# Patient Record
(demographics unavailable — no encounter records)

---

## 2024-10-23 NOTE — DISCUSSION/SUMMARY
[FreeTextEntry1] : 7 mo old male here for flu and pcv vaccine.  GIven in R thigh  offered rsv vaccine, parents want to think about it and will return if desired [] : The components of the vaccine(s) to be administered today are listed in the plan of care. The disease(s) for which the vaccine(s) are intended to prevent and the risks have been discussed with the caretaker.  The risks are also included in the appropriate vaccination information statements which have been provided to the patient's caregiver.  The caregiver has given consent to vaccinate.

## 2024-10-23 NOTE — HISTORY OF PRESENT ILLNESS
[PCV 20] : PCV 20 [Influenza] : Influenza [FreeTextEntry1] : 7 mo old no recent fevers.illness here for flu #2 and pcv 20 #3

## 2024-12-12 NOTE — HISTORY OF PRESENT ILLNESS
[TextBox_4] : YAMILET is here for evaluation today. He was born at term after an unassisted conception and was circumcised in the nursery. 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 The penis has not changed in its configuration since the parents first noticed this. No urinary tract or penile redness or infections. He makes ample wet diapers without hematuria. No family history of penile abnormalities.

## 2024-12-12 NOTE — ASSESSMENT
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 I discussed the options including observation and surgery. The principles of the operation and the anticipated postoperative course were discussed.  After discussing the risks and benefits and possible complications (including but not limited to penile injury, bleeding, infection, penile deformity and need for additional surgery), the decision to proceed with revision of a circumcision under general anesthesia was made.  All questions were answered.

## 2024-12-12 NOTE — PHYSICAL EXAM
[TextBox_92] : PENIS: Straight circumcised penis with redundant foreskin.  SCROTUM: Bilaterally symmetric testes in dependent position without palpable mass, hernia or hydrocele.

## 2024-12-12 NOTE — CONSULT LETTER
[FreeTextEntry1] : Dear Dr. MARIPOSA BUCK,      I had the pleasure of consulting on YAMILET BEGUMNT today.  Below is my note regarding the office visit today.      Thank you so very much for allowing me to participate in YAMILET's care.  Please don't hesitate to call me should any questions or issues arise.        Sincerely,     Scotty Loving MD, FACS, FSPU    Chief, Pediatric Urology    Professor of Urology and Pediatrics    [employer]        President, [employer] U[employer] Association - [place] Section    Past-President, Societies for Pediatric U[employer]

## 2024-12-12 NOTE — CONSULT LETTER
[FreeTextEntry1] : Dear Dr. MARIPOSA BUCK,      I had the pleasure of consulting on YAMILET BEGUMNT today.  Below is my note regarding the office visit today.      Thank you so very much for allowing me to participate in YAMILET's care.  Please don't hesitate to call me should any questions or issues arise.        Sincerely,     Scotty Loving MD, FACS, FSPU    Chief, Pediatric Urology    Professor of Urology and Pediatrics    [employer]        President, American U[employer] Association - [place] Section    Past-President, Societies for Pediatric Urology None

## 2024-12-12 NOTE — CONSULT LETTER
[FreeTextEntry1] : Dear Dr. MARIPOSA BUCK,      I had the pleasure of consulting on YAMILET BEGUMNT today.  Below is my note regarding the office visit today.      Thank you so very much for allowing me to participate in YAMILET's care.  Please don't hesitate to call me should any questions or issues arise.        Sincerely,     Scotty Loving MD, FACS, FSPU    Chief, Pediatric Urology    Professor of Urology and Pediatrics    [employer]        President, American Urological Association - [place] Section    Past-President, Societies for Pediatric Urology

## 2024-12-18 NOTE — DISCUSSION/SUMMARY
[Normal Growth] : growth [Normal Development] : development [None] : No known medical problems [No Elimination Concerns] : elimination [No Feeding Concerns] : feeding [No Skin Concerns] : skin [Normal Sleep Pattern] : sleep [Term Infant] : Term infant [Lack Of Adequate Sleep] : lack of adequate sleep [No Medications] : ~He/She~ is not on any medications [Parent/Guardian] : parent/guardian [] : The components of the vaccine(s) to be administered today are listed in the plan of care. The disease(s) for which the vaccine(s) are intended to prevent and the risks have been discussed with the caretaker.  The risks are also included in the appropriate vaccination information statements which have been provided to the patient's caregiver.  The caregiver has given consent to vaccinate. [FreeTextEntry1] : Continue breast-milk or formula as desired. Increase table foods, 3 meals with 2-3 snacks per day. Incorporate up to 6 oz of flourinated water daily in a sippy cup. Discussed weaning of bottle and pacifier. Wipe teeth daily with washcloth. When in car, patient should be in rear-facing car seat in back seat. Put baby to sleep in own crib with no loose or soft bedding. Lower crib matres. Help baby to maintain consistent daily routines and sleep schedule. Recognize stranger anxiety. Ensure home is safe since baby is increasingly mobile. Be within arm's reach of baby at all times. Use consistent, positive discipline. Avoid screen time. Read aloud to baby. Child proof safety the home discussed.

## 2024-12-18 NOTE — PHYSICAL EXAM
[Alert] : alert [Acute Distress] : no acute distress [Normocephalic] : normocephalic [Flat Open Anterior [place]] : flat open anterior fontanelle [Red Reflex] : red reflex bilateral [Excessive Tearing] : no excessive tearing [PERRL] : PERRL [Normally Placed Ears] : normally placed ears [Auricles Well Formed] : auricles well formed [Clear Tympanic membranes] : clear tympanic membranes [Light reflex present] : light reflex present [Bony landmarks visible] : bony landmarks visible [Discharge] : no discharge [Nares Patent] : nares patent [Palate Intact] : palate intact [Uvula Midline] : uvula midline [Tooth Eruption] : tooth eruption [Supple, full passive range of motion] : supple, full passive range of motion [Palpable Masses] : no palpable masses [Symmetric Chest Rise] : symmetric chest rise [Clear to Auscultation Bilaterally] : clear to auscultation bilaterally [Regular Rate and Rhythm] : regular rate and rhythm [S1, S2 present] : S1, S2 present [Murmurs] : no murmurs [+2 Femoral Pulses] : (+) 2 femoral pulses [Soft] : soft [Tender] : nontender [Distended] : nondistended [Bowel Sounds] : bowel sounds present [Hepatomegaly] : no hepatomegaly [Splenomegaly] : no splenomegaly [Central Urethral Opening] : central urethral opening [Testicles Descended] : testicles descended bilaterally [No Abnormal Lymph Nodes Palpated] : no abnormal lymph nodes palpated [Symmetric Abduction and Rotation of hips] : symmetric abduction and rotation of hips [Allis Sign] : negative Allis sign [Straight] : straight [Cranial Nerves Grossly Intact] : cranial nerves grossly intact [Rash or Lesions] : no rash/lesions

## 2024-12-18 NOTE — DEVELOPMENTAL MILESTONES
[Normal Development] : Normal Development [None] : none [Uses basic gestures] : uses basic gestures [Says "Denny" or "Mama"] : says "Denny" or "Mama" nonspecifically [Sits well without support] : sits well without support [Transitions between sitting and lying] : transitions between sitting and lying [Balances on hands and knees] : balances on hands and knees [Picks up small objects with 3 fingers] : picks up small objects with 3 fingers and thumb [Releases objects intentionally] : releases objects intentionally [[place] objects together] : bangs objects together [Yes] : Completed.

## 2024-12-18 NOTE — HISTORY OF PRESENT ILLNESS
[Mother] : mother [Hours between feeds ___] : Child is fed every [unfilled] hours [Fruit] : fruit [Vegetables] : vegetables [Normal] : Normal [Formula ___ oz/feed] : [unfilled] oz of formula per feed [Vitamin ___] : no vitamins [Cereal] : cereal [Meat] : meat [Eggs] : eggs [Peanut] : no peanut [Dairy] : dairy [Baby food] : baby food [Water] : water [Frequency of stools: ___] : Frequency of stools: [unfilled]  stools [per day] : per day. [In Crib] : sleeps in crib [On back] : sleeps on back [Co-sleeping] : no co-sleeping [Wakes up at night] : wakes up at night [Sleeps 12-16 hours per 24 hours (including naps)] : Does not sleep 12-16 hours per 24 hours (including naps) [Loose bedding, pillow, toys, and/or bumpers in crib] : no loose bedding, pillow, toys, and/or bumpers in crib [Pacifier use] : Pacifier use [Sippy Cup use] : sippy cup use [Brushing teeth] : brushing teeth [Tap water] : Primary Fluoride Source: Tap water [Screen time only for video chatting] : screen time only for video chatting [No] : Not at  exposure [Water heater temperature set at <120 degrees F] : Water heater temperature set at <120 degrees F [Rear facing car seat in  back seat] : Rear facing car seat in  back seat [Carbon Monoxide Detectors] : Carbon monoxide detectors [Smoke Detectors] : Smoke detectors [Up to date] : Up to date [de-identified] : still not going to sleep despite sleep training. cries until he throws up [FreeTextEntry7] : 9 month old for his well visit [NO] : No

## 2025-01-24 NOTE — DISCUSSION/SUMMARY
[FreeTextEntry1] : 10 mo old male here with acute uri.  + congestion and runny nose with fever improving over the past few days.   Well appearing, well hydrated, afebrile in office. Clear lungs upon auscultation. Unremarkable oropharynx and otic exam. rapid flu negative in office RVP sent will f/u results.  Supportive care: Nasal saline, suction, humidifier as needed. Drink plenty of fluids. Fever reducing medications as needed. OTC cough suppressants not recommended in children under the age of 7. Can try Zarbee's or Rosangela's herbal cough soothing syrup. Avoid those that have honey if under 1 year of age. Seek immediate attention for difficulty breathing, dehydration or prolonged fever (more than 5 days)/elevated fever (greater than 105). Check back if symptoms are prolonged, worsening or if new concerns arise.  Parent/Guardian agree with and express understanding of plan.

## 2025-01-24 NOTE — PHYSICAL EXAM
[No Acute Distress] : no acute distress [Alert] : alert [Consolable] : consolable [Playful] : playful [Normocephalic] : normocephalic [[place]] : fontanelle flat [EOMI] : grossly EOMI [Conjunctival Injection] : no conjunctival injection [Clear] : right tympanic membrane clear [Pink Nasal Mucosa] : pink nasal mucosa [Clear Rhinorrhea] : clear rhinorrhea [Erythematous Oropharynx] : nonerythematous oropharynx [Supple] : supple [Clear to Auscultation Bilaterally] : clear to auscultation bilaterally [Wheezing] : no wheezing [Rales] : no rales [Crackles] : no crackles [Tachypnea] : no tachypnea [[place]] : no rhonchi [Belly Breathing] : no belly breathing [No Abnormal Lymph Nodes Palpated] : no abnormal lymph nodes palpated [Warm, Well Perfused x4] : warm, well perfused x4 [FreeTextEntry4] : + nasal congestion  [de-identified] : no rash, MMM

## 2025-01-24 NOTE — HISTORY OF PRESENT ILLNESS
[EENT/Resp Symptoms] : EENT/RESPIRATORY SYMPTOMS [Fever] : fever [Nasal congestion] : nasal congestion [Runny nose] : runny nose [___ Day(s)] : [unfilled] day(s) [Intermittent] : intermittent [Playful] : playful [Known exposure to COVID-19] : no known exposure to COVID-19 [Hx of recent COVID-19 infection] : no history of recent COVID-19 infection [Sick Contacts: ___] : sick contacts: [unfilled] [Clear rhinorrhea] : clear rhinorrhea [Nasal suctioning] : nasal suctioning [Ibuprofen] : ibuprofen [Change in sleep pattern] : no change in sleep pattern [Eye Redness] : no eye redness [Eye Discharge] : no eye discharge [Ear Tugging] : no ear tugging [Runny Nose] : runny nose [Nasal Congestion] : nasal congestion [Teething] : teething [Cough] : no cough [Wheezing] : no wheezing [Decreased Appetite] : no decreased appetite [Posttussive emesis] : no posttussive emesis [Vomiting] : vomiting [Diarrhea] : no diarrhea [Decreased Urine Output] : no decreased urine output [Rash] : no rash [Max Temp: ____] : Max temperature: [unfilled] [Improving] : improving [FreeTextEntry2] : fever

## 2025-02-18 NOTE — PHYSICAL EXAM
[Mucoid Discharge] : mucoid discharge [Inflamed Nasal Mucosa] : inflamed nasal mucosa [Transmitted Upper Airway Sounds] : transmitted upper airway sounds [NL] : warm, clear [FreeTextEntry7] : No wheezing

## 2025-02-18 NOTE — DISCUSSION/SUMMARY
[FreeTextEntry1] :  Eleven month old male with Viral febrile illness/ Viral Syndrome. RVP send to lab. Continue with fever reducers as needed and lots of fluids.

## 2025-02-18 NOTE — HISTORY OF PRESENT ILLNESS
[FreeTextEntry6] :  Eleven month old male brought to the office because of fever since yesterday with runny nose and congestion. Exposed to his cousin who has similar symptoms.

## 2025-02-28 NOTE — HISTORY OF PRESENT ILLNESS
[EENT/Resp Symptoms] : EENT/RESPIRATORY SYMPTOMS [Fever] : fever [Nasal congestion] : nasal congestion [Runny nose] : runny nose [Chest congestion] : chest congestion [Ear Pain] : ear pain [___ Week(s)] : [unfilled] week(s) [Intermittent] : intermittent [Irritable] : irritable [Decreased appetite] : decreased appetite [Mucoid discharge] : mucoid discharge [Wet cough] : wet cough [Humidifier] : humidifier [Nasal saline] : nasal saline [Change in sleep pattern] : change in sleep pattern [Ear Tugging] : ear tugging [Runny Nose] : runny nose [Nasal Congestion] : nasal congestion [Teething] : teething [Cough] : cough [Wheezing] : no wheezing [Decreased Appetite] : decreased appetite [Vomiting] : no vomiting [Rash] : no rash [Max Temp: ____] : Max temperature: [unfilled]

## 2025-02-28 NOTE — DISCUSSION/SUMMARY
[FreeTextEntry1] : URI with rhinovirus from last week. New onset fever from ROM likely due to viral URI. Recommend supportive care including antipyretics, fluids, and nasal saline followed by nasal suction. Return if symptoms worsen or persist. Complete 10 days of antibiotic. Provide ibuprofen as needed for pain or fever. If no improvement within 48 hours return for re-evaluation. Follow up in 2-3 wks for tympanometry.

## 2025-03-21 NOTE — HISTORY OF PRESENT ILLNESS
[EENT/Resp Symptoms] : EENT/RESPIRATORY SYMPTOMS [Ear pain] : ear pain [___ Day(s)] : [unfilled] day(s) [Intermittent] : intermittent [At Night] : at night [Change in sleep pattern] : change in sleep pattern [Ear Tugging] : ear tugging [Consolable] : consolable [Sick Contacts: ___] : no sick contacts [Clear rhinorrhea] : clear rhinorrhea [Vomiting] : no vomiting [Decreased Urine Output] : no decreased urine output [Rash] : no rash [FreeTextEntry3] : did not nap at all yesterday and woke up crying a lot last night tugging his left ear.

## 2025-03-21 NOTE — DISCUSSION/SUMMARY
[FreeTextEntry1] : otalgia and ceruminous.  Recommend acetaminophen or ibuprofen prn. Offer teething rings. Apply cold or warm compress to gums. follow up in office or on the phone if he continues to cry at night and is not relieved by motrin for pain. Use debrox if possible to melt the ear wax.

## 2025-04-10 NOTE — PHYSICAL EXAM
[Bulging] : bulging [Erythema] : erythema [Clear Effusion] : clear effusion [Tired appearing] : tired appearing [FreeTextEntry1] : General: irritable, lethargic, clingy HEENT: clear rhinorrhea, TM clear, pharynx mildly injected NECK: soft, no Brudzinski or Kernig signs CHEST: transmitted sounds, no rales or crackles CVS: mild tachycardia, no murmur EXT: no edema,  cap refills less than 2 sec SKIN: hot to touch, no rashes

## 2025-04-10 NOTE — DISCUSSION/SUMMARY
[FreeTextEntry1] : high fever and bilateral otitis media Complete 10 days of antibiotic. Provide ibuprofen as needed for pain or fever. If no improvement within 48 hours return for re-evaluation. Follow up in 2-3 wks for tympanometry. Rapid Flu negative.

## 2025-04-10 NOTE — HISTORY OF PRESENT ILLNESS
[EENT/Resp Symptoms] : EENT/RESPIRATORY SYMPTOMS [Nasal congestion] : nasal congestion [Runny nose] : runny nose [Cough] : cough [___ Day(s)] : [unfilled] day(s) [Constant] : constant [Irritable] : irritable [Decreased appetite] : decreased appetite [Sick Contacts: ___] : sick contacts: [unfilled] [Mucoid discharge] : mucoid discharge [Dry cough] : dry cough [Fever] : fever [Change in sleep pattern] : change in sleep pattern [Ear Tugging] : ear tugging [Runny Nose] : runny nose [Nasal Congestion] : nasal congestion [Teething] : teething [Decreased Appetite] : decreased appetite [Max Temp: ____] : Max temperature: [unfilled] [Worsening] : worsening [Ear pain] : ear pain [Vomiting] : no vomiting [Diarrhea] : no diarrhea [Rash] : no rash

## 2025-05-17 NOTE — HISTORY OF PRESENT ILLNESS
[Preoperative Visit] : for a medical evaluation prior to surgery [Good] : Good [Fever] : no fever [Chills] : no chills [Runny Nose] : no runny nose [Earache] : no earache [Headache] : no headache [Sore Throat] : no sore throat [Cough] : no cough [Appetite] : no decrease in appetite [Nausea] : no nausea [Vomiting] : no vomiting [Abdominal Pain] : no abdominal pain [Diarrhea] : no diarrhea [Easy Bruising] : no easy bruising [Rash] : no rash [Dysuria] : no dysuria [Urinary Frequency] : no urinary frequency [Prior Anesthesia] : No prior anesthesia [Prev Anesthesia Reaction] : no previous anesthesia reaction [Diabetes] : no diabetes [Pulmonary Disease] : no pulmonary disease [Renal Disease] : no renal disease [GI Disease] : no gastrointestinal disease [Sleep Apnea] : no sleep apnea [Transfusion Reaction] : no transfusion reaction [Impaired Immunity] : no impaired immunity [Frequent use of NSAIDs] : no use of NSAIDs [Anesthesia Reaction] : no anesthesia reaction [Clotting Disorder] : no clotting disorder [Bleeding Disorder] : no bleeding disorder [Sudden Death] : no sudden death [FreeTextEntry1] : Revision of Circumcision [FreeTextEntry2] : 5/23/25 [de-identified] : Dr. Loving [Mother] : mother [Hours between feeds ___] : Child is fed every [unfilled] hours [Fruit] : fruit [Vegetables] : vegetables [Meat] : meat [Dairy] : dairy [Finger food] : finger food [Table food] : table food [___ voids per day] : [unfilled] voids per day [Normal] : Normal [Wakes up at night] : Wakes up at night [Brushing teeth] : Brushing teeth [Tap water] : Primary Fluoride Source: Tap water [Playtime] : Playtime  [No] : Not at  exposure [Water heater temperature set at <120 degrees F] : Water heater temperature set at <120 degrees F [Car seat in back seat] : Car seat in back seat [Smoke Detectors] : Smoke detectors [Carbon Monoxide Detectors] : Carbon monoxide detectors [Delayed] : Delayed [NO] : No [Exposure to electronic nicotine delivery system] : No exposure to electronic nicotine delivery system [At risk for exposure to TB] : Not at risk for exposure to Tuberculosis [FreeTextEntry7] : 14-month-old male presents for well check visit. Has been doing well since the last visit.  [de-identified] : (1) Needs clearance form for revision of circumcision. No vaccinations at this visit, until after procedure.  [de-identified] : Needs circumcision revision prior to obtaining next set of vaccinations.

## 2025-05-17 NOTE — PHYSICAL EXAM
[General Appearance - Alert] : alert [General Appearance - Well-Appearing] : well appearing [General Appearance - In No Acute Distress] : in no acute distress [Appearance Of Head] : the head was normocephalic [Evidence Of Head Injury] : threre was no evidence of injury [Facies] : no facial abnormalities were observed [Sclera] : the conjunctiva were normal [Outer Ear] : the ears and nose were normal in appearance [Examination Of The Oral Cavity] : mucous membranes were moist and pink [Normal Appearance] : was normal in appearance [Neck Supple] : was supple [Respiration, Rhythm And Depth] : normal respiratory rhythm and effort [Auscultation Breath Sounds / Voice Sounds] : clear bilateral breath sounds [Heart Rate And Rhythm] : heart rate and rhythm were normal [Heart Sounds] : normal S1 and S2 [Bowel Sounds] : normal bowel sounds [Abdomen Soft] : soft [Abdomen Tenderness] : non-tender [Abdominal Distention] : nondistended [] : no hepato-splenomegaly [Atraumatic] : the extremities were atraumatic [FROM Extremities] : there was normal movement of all extremities [Normal Joints] : there was no swelling or deformity of the joints [Normal Motor Tone] : the muscle tone was normal [Involuntary Movements] : no involuntary movements were seen [No Visual Abnormalities] : no visible abnormailities [Motor Tone] : muscle strength and tone were normal [Generalized Lymph Node Enlargement] : no lymphadenopathy [Normal] : normal texture and mobility [Penis Abnormality] : the penis was normal [Scrotum] : the scrotum was normal [Testes Cryptorchism] : both testicles were descended [Dandre Stage _____] : the Dandre stage for pubic hair development was [unfilled]  [Enlarged Diffusely] : was not enlarged [Abnormal Color] : normal color and pigmentation [Skin Lesions 1] : no skin lesions were observed [Skin Turgor Decreased] : normal skin turgor [FreeTextEntry1] : + redundant foreskin [Alert] : alert [Normocephalic] : normocephalic [Closed Anterior [place]] : closed anterior fontanelle [Red Reflex] : red reflex bilateral [PERRL] : PERRL [Normally Placed Ears] : normally placed ears [Auricles Well Formed] : auricles well formed [Clear Tympanic membranes] : clear tympanic membranes [Light reflex present] : light reflex present [Bony landmarks visible] : bony landmarks visible [Nares Patent] : nares patent [Palate Intact] : palate intact [Uvula Midline] : uvula midline [Tooth Eruption] : tooth eruption [Supple, full passive range of motion] : supple, full passive range of motion [Symmetric Chest Rise] : symmetric chest rise [Clear to Auscultation Bilaterally] : clear to auscultation bilaterally [Regular Rate and Rhythm] : regular rate and rhythm [S1, S2 present] : S1, S2 present [+2 Femoral Pulses] : (+) 2 femoral pulses [Soft] : soft [Bowel Sounds] : normoactive bowel sounds [Normal External Genitalia] : normal external genitalia [Circumcised] : circumcised [Central Urethral Opening] : central urethral opening [Testicles Descended] : testicles descended bilaterally [Symmetric Abduction and Rotation of Hips] : symmetric abduction and rotation of hips [Straight] : straight [Cranial Nerves Grossly Intact] : cranial nerves grossly intact [Discharge] : no discharge [Palpable Masses] : no palpable masses [Murmurs] : no murmurs [Tender] : nontender [Distended] : nondistended [Hepatomegaly] : no hepatomegaly [Splenomegaly] : no splenomegaly [Allis Sign] : negative Allis sign [Rash or Lesions] : no rash/lesions [de-identified] : + redundant foreskin

## 2025-05-17 NOTE — HISTORY OF PRESENT ILLNESS
[Preoperative Visit] : for a medical evaluation prior to surgery [Good] : Good [Fever] : no fever [Chills] : no chills [Runny Nose] : no runny nose [Earache] : no earache [Headache] : no headache [Sore Throat] : no sore throat [Cough] : no cough [Appetite] : no decrease in appetite [Nausea] : no nausea [Vomiting] : no vomiting [Abdominal Pain] : no abdominal pain [Diarrhea] : no diarrhea [Easy Bruising] : no easy bruising [Rash] : no rash [Dysuria] : no dysuria [Urinary Frequency] : no urinary frequency [Prior Anesthesia] : No prior anesthesia [Prev Anesthesia Reaction] : no previous anesthesia reaction [Diabetes] : no diabetes [Pulmonary Disease] : no pulmonary disease [Renal Disease] : no renal disease [GI Disease] : no gastrointestinal disease [Sleep Apnea] : no sleep apnea [Transfusion Reaction] : no transfusion reaction [Impaired Immunity] : no impaired immunity [Frequent use of NSAIDs] : no use of NSAIDs [Anesthesia Reaction] : no anesthesia reaction [Clotting Disorder] : no clotting disorder [Bleeding Disorder] : no bleeding disorder [Sudden Death] : no sudden death [FreeTextEntry1] : Revision of Circumcision [FreeTextEntry2] : 5/23/25 [de-identified] : Dr. Loving [Mother] : mother [Hours between feeds ___] : Child is fed every [unfilled] hours [Fruit] : fruit [Vegetables] : vegetables [Meat] : meat [Dairy] : dairy [Finger food] : finger food [Table food] : table food [___ voids per day] : [unfilled] voids per day [Normal] : Normal [Wakes up at night] : Wakes up at night [Brushing teeth] : Brushing teeth [Tap water] : Primary Fluoride Source: Tap water [Playtime] : Playtime  [No] : Not at  exposure [Water heater temperature set at <120 degrees F] : Water heater temperature set at <120 degrees F [Car seat in back seat] : Car seat in back seat [Smoke Detectors] : Smoke detectors [Carbon Monoxide Detectors] : Carbon monoxide detectors [Delayed] : Delayed [NO] : No [Exposure to electronic nicotine delivery system] : No exposure to electronic nicotine delivery system [At risk for exposure to TB] : Not at risk for exposure to Tuberculosis [FreeTextEntry7] : 14-month-old male presents for well check visit. Has been doing well since the last visit.  [de-identified] : (1) Needs clearance form for revision of circumcision. No vaccinations at this visit, until after procedure.  [de-identified] : Needs circumcision revision prior to obtaining next set of vaccinations.

## 2025-05-17 NOTE — PHYSICAL EXAM
[General Appearance - Alert] : alert [General Appearance - Well-Appearing] : well appearing [General Appearance - In No Acute Distress] : in no acute distress [Appearance Of Head] : the head was normocephalic [Evidence Of Head Injury] : threre was no evidence of injury [Facies] : no facial abnormalities were observed [Sclera] : the conjunctiva were normal [Outer Ear] : the ears and nose were normal in appearance [Examination Of The Oral Cavity] : mucous membranes were moist and pink [Normal Appearance] : was normal in appearance [Neck Supple] : was supple [Respiration, Rhythm And Depth] : normal respiratory rhythm and effort [Auscultation Breath Sounds / Voice Sounds] : clear bilateral breath sounds [Heart Rate And Rhythm] : heart rate and rhythm were normal [Heart Sounds] : normal S1 and S2 [Bowel Sounds] : normal bowel sounds [Abdomen Soft] : soft [Abdomen Tenderness] : non-tender [Abdominal Distention] : nondistended [] : no hepato-splenomegaly [Atraumatic] : the extremities were atraumatic [FROM Extremities] : there was normal movement of all extremities [Normal Joints] : there was no swelling or deformity of the joints [Normal Motor Tone] : the muscle tone was normal [Involuntary Movements] : no involuntary movements were seen [No Visual Abnormalities] : no visible abnormailities [Motor Tone] : muscle strength and tone were normal [Generalized Lymph Node Enlargement] : no lymphadenopathy [Normal] : normal texture and mobility [Penis Abnormality] : the penis was normal [Scrotum] : the scrotum was normal [Testes Cryptorchism] : both testicles were descended [Dandre Stage _____] : the Dandre stage for pubic hair development was [unfilled]  [Enlarged Diffusely] : was not enlarged [Abnormal Color] : normal color and pigmentation [Skin Lesions 1] : no skin lesions were observed [Skin Turgor Decreased] : normal skin turgor [FreeTextEntry1] : + redundant foreskin [Alert] : alert [Normocephalic] : normocephalic [Closed Anterior [place]] : closed anterior fontanelle [Red Reflex] : red reflex bilateral [PERRL] : PERRL [Normally Placed Ears] : normally placed ears [Auricles Well Formed] : auricles well formed [Clear Tympanic membranes] : clear tympanic membranes [Light reflex present] : light reflex present [Bony landmarks visible] : bony landmarks visible [Nares Patent] : nares patent [Palate Intact] : palate intact [Uvula Midline] : uvula midline [Tooth Eruption] : tooth eruption [Supple, full passive range of motion] : supple, full passive range of motion [Symmetric Chest Rise] : symmetric chest rise [Clear to Auscultation Bilaterally] : clear to auscultation bilaterally [Regular Rate and Rhythm] : regular rate and rhythm [S1, S2 present] : S1, S2 present [+2 Femoral Pulses] : (+) 2 femoral pulses [Soft] : soft [Bowel Sounds] : normoactive bowel sounds [Normal External Genitalia] : normal external genitalia [Circumcised] : circumcised [Central Urethral Opening] : central urethral opening [Testicles Descended] : testicles descended bilaterally [Symmetric Abduction and Rotation of Hips] : symmetric abduction and rotation of hips [Straight] : straight [Cranial Nerves Grossly Intact] : cranial nerves grossly intact [Discharge] : no discharge [Palpable Masses] : no palpable masses [Murmurs] : no murmurs [Tender] : nontender [Distended] : nondistended [Hepatomegaly] : no hepatomegaly [Splenomegaly] : no splenomegaly [Allis Sign] : negative Allis sign [Rash or Lesions] : no rash/lesions [de-identified] : + redundant foreskin

## 2025-05-17 NOTE — PHYSICAL EXAM
[General Appearance - Alert] : alert [General Appearance - Well-Appearing] : well appearing [General Appearance - In No Acute Distress] : in no acute distress [Appearance Of Head] : the head was normocephalic [Evidence Of Head Injury] : threre was no evidence of injury [Facies] : no facial abnormalities were observed [Sclera] : the conjunctiva were normal [Outer Ear] : the ears and nose were normal in appearance [Examination Of The Oral Cavity] : mucous membranes were moist and pink [Normal Appearance] : was normal in appearance [Neck Supple] : was supple [Respiration, Rhythm And Depth] : normal respiratory rhythm and effort [Auscultation Breath Sounds / Voice Sounds] : clear bilateral breath sounds [Heart Rate And Rhythm] : heart rate and rhythm were normal [Heart Sounds] : normal S1 and S2 [Bowel Sounds] : normal bowel sounds [Abdomen Soft] : soft [Abdomen Tenderness] : non-tender [Abdominal Distention] : nondistended [] : no hepato-splenomegaly [Atraumatic] : the extremities were atraumatic [FROM Extremities] : there was normal movement of all extremities [Normal Joints] : there was no swelling or deformity of the joints [Normal Motor Tone] : the muscle tone was normal [Involuntary Movements] : no involuntary movements were seen [No Visual Abnormalities] : no visible abnormailities [Motor Tone] : muscle strength and tone were normal [Generalized Lymph Node Enlargement] : no lymphadenopathy [Normal] : normal texture and mobility [Penis Abnormality] : the penis was normal [Scrotum] : the scrotum was normal [Testes Cryptorchism] : both testicles were descended [Dandre Stage _____] : the Dandre stage for pubic hair development was [unfilled]  [Enlarged Diffusely] : was not enlarged [Abnormal Color] : normal color and pigmentation [Skin Lesions 1] : no skin lesions were observed [Skin Turgor Decreased] : normal skin turgor [FreeTextEntry1] : + redundant foreskin [Alert] : alert [Normocephalic] : normocephalic [Closed Anterior [place]] : closed anterior fontanelle [Red Reflex] : red reflex bilateral [PERRL] : PERRL [Normally Placed Ears] : normally placed ears [Auricles Well Formed] : auricles well formed [Clear Tympanic membranes] : clear tympanic membranes [Light reflex present] : light reflex present [Bony landmarks visible] : bony landmarks visible [Nares Patent] : nares patent [Palate Intact] : palate intact [Uvula Midline] : uvula midline [Tooth Eruption] : tooth eruption [Supple, full passive range of motion] : supple, full passive range of motion [Symmetric Chest Rise] : symmetric chest rise [Clear to Auscultation Bilaterally] : clear to auscultation bilaterally [Regular Rate and Rhythm] : regular rate and rhythm [S1, S2 present] : S1, S2 present [+2 Femoral Pulses] : (+) 2 femoral pulses [Soft] : soft [Bowel Sounds] : normoactive bowel sounds [Normal External Genitalia] : normal external genitalia [Circumcised] : circumcised [Central Urethral Opening] : central urethral opening [Testicles Descended] : testicles descended bilaterally [Symmetric Abduction and Rotation of Hips] : symmetric abduction and rotation of hips [Straight] : straight [Cranial Nerves Grossly Intact] : cranial nerves grossly intact [Discharge] : no discharge [Palpable Masses] : no palpable masses [Murmurs] : no murmurs [Tender] : nontender [Distended] : nondistended [Hepatomegaly] : no hepatomegaly [Splenomegaly] : no splenomegaly [Allis Sign] : negative Allis sign [Rash or Lesions] : no rash/lesions [de-identified] : + redundant foreskin

## 2025-05-17 NOTE — DEVELOPMENTAL MILESTONES
Detail Level: Zone [Normal Development] : Normal Development [None] : none [Looks for hidden objects] : looks for hidden objects [Imitates new gestures] : imitates new gestures [Uses one word other than Mom or] : uses one word other than Mom or Dad or personal names [Follows a verbal command that] : follows a verbal command that includes a gesture [Takes first independent] : takes first independent steps [Stands without support] : stands without support [Picks up small object with 2 finger] : picks up small object with 2 finger pincer grasp [Picks up food and eats it] : picks up food and eats it

## 2025-05-17 NOTE — DISCUSSION/SUMMARY
[Cleared for Procedure] : cleared for procedure [Normal Growth] : growth [Normal Development] : development [No Elimination Concerns] : elimination [No Feeding Concerns] : feeding [No Skin Concerns] : skin [Normal Sleep Pattern] : sleep [Family Support] : family support [Establishing Routines] : establishing routines [Feeding and Appetite Changes] : feeding and appetite changes [Establishing A Dental Home] : establishing a dental home [Safety] : safety [Parent/Guardian] : parent/guardian [Mother] : mother [FreeTextEntry1] : 14 month male growing and developing well.  Transition to whole cow's milk. Continue table foods, 3 meals with 2-3 snacks per day. Incorporate up to 6 oz of fluorinated water daily in a sippy cup. Brush teeth twice a day with soft toothbrush. Recommend visit to dentist. When in car, keep child in rear-facing car seats until age 2, or until  the maximum height and weight for seat is reached. Put baby to sleep in own crib with no loose or soft bedding. Lower crib mattress. Help baby to maintain consistent daily routines and sleep schedule. Recognize stranger and separation anxiety. Ensure home is safe since baby is increasingly mobile. Be within arm's reach of baby at all times. Use consistent, positive discipline. Avoid screen time. Read aloud to baby.  Immunizations - Will return for vaccinations.   Labs - CBC and lead level. Will follow-up with results.   Will follow-up in one month for 15 month old well check visit, and vaccinations.

## 2025-05-17 NOTE — HISTORY OF PRESENT ILLNESS
[Preoperative Visit] : for a medical evaluation prior to surgery [Good] : Good [Fever] : no fever [Chills] : no chills [Runny Nose] : no runny nose [Earache] : no earache [Headache] : no headache [Sore Throat] : no sore throat [Cough] : no cough [Appetite] : no decrease in appetite [Nausea] : no nausea [Vomiting] : no vomiting [Abdominal Pain] : no abdominal pain [Diarrhea] : no diarrhea [Easy Bruising] : no easy bruising [Rash] : no rash [Dysuria] : no dysuria [Urinary Frequency] : no urinary frequency [Prior Anesthesia] : No prior anesthesia [Prev Anesthesia Reaction] : no previous anesthesia reaction [Diabetes] : no diabetes [Pulmonary Disease] : no pulmonary disease [Renal Disease] : no renal disease [GI Disease] : no gastrointestinal disease [Sleep Apnea] : no sleep apnea [Transfusion Reaction] : no transfusion reaction [Impaired Immunity] : no impaired immunity [Frequent use of NSAIDs] : no use of NSAIDs [Anesthesia Reaction] : no anesthesia reaction [Clotting Disorder] : no clotting disorder [Bleeding Disorder] : no bleeding disorder [Sudden Death] : no sudden death [FreeTextEntry1] : Revision of Circumcision [FreeTextEntry2] : 5/23/25 [de-identified] : Dr. Loving [Mother] : mother [Hours between feeds ___] : Child is fed every [unfilled] hours [Fruit] : fruit [Vegetables] : vegetables [Meat] : meat [Dairy] : dairy [Finger food] : finger food [Table food] : table food [___ voids per day] : [unfilled] voids per day [Normal] : Normal [Wakes up at night] : Wakes up at night [Brushing teeth] : Brushing teeth [Tap water] : Primary Fluoride Source: Tap water [Playtime] : Playtime  [No] : Not at  exposure [Water heater temperature set at <120 degrees F] : Water heater temperature set at <120 degrees F [Car seat in back seat] : Car seat in back seat [Smoke Detectors] : Smoke detectors [Carbon Monoxide Detectors] : Carbon monoxide detectors [Delayed] : Delayed [NO] : No [Exposure to electronic nicotine delivery system] : No exposure to electronic nicotine delivery system [At risk for exposure to TB] : Not at risk for exposure to Tuberculosis [FreeTextEntry7] : 14-month-old male presents for well check visit. Has been doing well since the last visit.  [de-identified] : (1) Needs clearance form for revision of circumcision. No vaccinations at this visit, until after procedure.  [de-identified] : Needs circumcision revision prior to obtaining next set of vaccinations.

## 2025-05-17 NOTE — HISTORY OF PRESENT ILLNESS
[Preoperative Visit] : for a medical evaluation prior to surgery [Good] : Good [Fever] : no fever [Chills] : no chills [Runny Nose] : no runny nose [Earache] : no earache [Headache] : no headache [Sore Throat] : no sore throat [Cough] : no cough [Appetite] : no decrease in appetite [Nausea] : no nausea [Vomiting] : no vomiting [Abdominal Pain] : no abdominal pain [Diarrhea] : no diarrhea [Easy Bruising] : no easy bruising [Rash] : no rash [Dysuria] : no dysuria [Urinary Frequency] : no urinary frequency [Prior Anesthesia] : No prior anesthesia [Prev Anesthesia Reaction] : no previous anesthesia reaction [Diabetes] : no diabetes [Pulmonary Disease] : no pulmonary disease [Renal Disease] : no renal disease [GI Disease] : no gastrointestinal disease [Sleep Apnea] : no sleep apnea [Transfusion Reaction] : no transfusion reaction [Impaired Immunity] : no impaired immunity [Frequent use of NSAIDs] : no use of NSAIDs [Anesthesia Reaction] : no anesthesia reaction [Clotting Disorder] : no clotting disorder [Bleeding Disorder] : no bleeding disorder [Sudden Death] : no sudden death [FreeTextEntry1] : Revision of Circumcision [FreeTextEntry2] : 5/23/25 [de-identified] : Dr. Loving [Mother] : mother [Hours between feeds ___] : Child is fed every [unfilled] hours [Fruit] : fruit [Vegetables] : vegetables [Meat] : meat [Dairy] : dairy [Finger food] : finger food [Table food] : table food [___ voids per day] : [unfilled] voids per day [Normal] : Normal [Wakes up at night] : Wakes up at night [Brushing teeth] : Brushing teeth [Tap water] : Primary Fluoride Source: Tap water [Playtime] : Playtime  [No] : Not at  exposure [Water heater temperature set at <120 degrees F] : Water heater temperature set at <120 degrees F [Car seat in back seat] : Car seat in back seat [Smoke Detectors] : Smoke detectors [Carbon Monoxide Detectors] : Carbon monoxide detectors [Delayed] : Delayed [NO] : No [Exposure to electronic nicotine delivery system] : No exposure to electronic nicotine delivery system [At risk for exposure to TB] : Not at risk for exposure to Tuberculosis [FreeTextEntry7] : 14-month-old male presents for well check visit. Has been doing well since the last visit.  [de-identified] : (1) Needs clearance form for revision of circumcision. No vaccinations at this visit, until after procedure.  [de-identified] : Needs circumcision revision prior to obtaining next set of vaccinations.

## 2025-05-17 NOTE — PHYSICAL EXAM
[General Appearance - Alert] : alert [General Appearance - Well-Appearing] : well appearing [General Appearance - In No Acute Distress] : in no acute distress [Appearance Of Head] : the head was normocephalic [Evidence Of Head Injury] : threre was no evidence of injury [Facies] : no facial abnormalities were observed [Sclera] : the conjunctiva were normal [Outer Ear] : the ears and nose were normal in appearance [Examination Of The Oral Cavity] : mucous membranes were moist and pink [Normal Appearance] : was normal in appearance [Neck Supple] : was supple [Respiration, Rhythm And Depth] : normal respiratory rhythm and effort [Auscultation Breath Sounds / Voice Sounds] : clear bilateral breath sounds [Heart Rate And Rhythm] : heart rate and rhythm were normal [Heart Sounds] : normal S1 and S2 [Bowel Sounds] : normal bowel sounds [Abdomen Soft] : soft [Abdomen Tenderness] : non-tender [Abdominal Distention] : nondistended [] : no hepato-splenomegaly [Atraumatic] : the extremities were atraumatic [FROM Extremities] : there was normal movement of all extremities [Normal Joints] : there was no swelling or deformity of the joints [Normal Motor Tone] : the muscle tone was normal [Involuntary Movements] : no involuntary movements were seen [No Visual Abnormalities] : no visible abnormailities [Motor Tone] : muscle strength and tone were normal [Generalized Lymph Node Enlargement] : no lymphadenopathy [Normal] : normal texture and mobility [Penis Abnormality] : the penis was normal [Scrotum] : the scrotum was normal [Testes Cryptorchism] : both testicles were descended [Dandre Stage _____] : the Dandre stage for pubic hair development was [unfilled]  [Enlarged Diffusely] : was not enlarged [Abnormal Color] : normal color and pigmentation [Skin Lesions 1] : no skin lesions were observed [Skin Turgor Decreased] : normal skin turgor [FreeTextEntry1] : + redundant foreskin [Alert] : alert [Normocephalic] : normocephalic [Closed Anterior [place]] : closed anterior fontanelle [Red Reflex] : red reflex bilateral [PERRL] : PERRL [Normally Placed Ears] : normally placed ears [Auricles Well Formed] : auricles well formed [Clear Tympanic membranes] : clear tympanic membranes [Light reflex present] : light reflex present [Bony landmarks visible] : bony landmarks visible [Nares Patent] : nares patent [Palate Intact] : palate intact [Uvula Midline] : uvula midline [Tooth Eruption] : tooth eruption [Supple, full passive range of motion] : supple, full passive range of motion [Symmetric Chest Rise] : symmetric chest rise [Clear to Auscultation Bilaterally] : clear to auscultation bilaterally [Regular Rate and Rhythm] : regular rate and rhythm [S1, S2 present] : S1, S2 present [+2 Femoral Pulses] : (+) 2 femoral pulses [Soft] : soft [Bowel Sounds] : normoactive bowel sounds [Normal External Genitalia] : normal external genitalia [Circumcised] : circumcised [Central Urethral Opening] : central urethral opening [Testicles Descended] : testicles descended bilaterally [Symmetric Abduction and Rotation of Hips] : symmetric abduction and rotation of hips [Straight] : straight [Cranial Nerves Grossly Intact] : cranial nerves grossly intact [Discharge] : no discharge [Palpable Masses] : no palpable masses [Murmurs] : no murmurs [Tender] : nontender [Distended] : nondistended [Hepatomegaly] : no hepatomegaly [Splenomegaly] : no splenomegaly [Allis Sign] : negative Allis sign [Rash or Lesions] : no rash/lesions [de-identified] : + redundant foreskin

## 2025-05-18 NOTE — PROCEDURE
[FreeTextEntry3] :  VPLASTY VENTRAL COLLAR REVISION OF CIRCUMCISION [FreeTextEntry5] : NONE [FreeTextEntry6] :  BANDAGE X 48 HRS BACITRACIN EVERY DIAPER CHANGE AFTER BANDAGE OFF X 2 DAYS THEN SWITCH TO VASELINE/AQUAPHOR X 1 MONTH BATHE 72 HRS FU 2-3 WEEKS (PHOTO OK)

## 2025-05-18 NOTE — CONSULT LETTER
[FreeTextEntry1] :   Dear Dr. WILLA HEBERT,   Our mutual patient, YAMILET FERNANDEZ underwent surgery today as outlined below. The procedure went well and he was discharged from the PACU after an uneventful stay. Discharge instructions were provided in writing. Instructions regarding follow up were also provided.   Sincerely,   Scotty Loving MD, FACS, FSPU Chief, Pediatric Urology Professor of Urology and Pediatrics [facility] [employer] at [facility].   President-elect, [employer]

## 2025-06-12 NOTE — REVIEW OF SYSTEMS
[Fever] : fever [Fussy] : fussy [Malaise] : malaise [Difficulty with Sleep] : difficulty with sleep [Nasal Congestion] : nasal congestion [Swollen Gums] : no swollen gums [Appetite Changes] : appetite changes [Vomiting] : no vomiting [Rash] : no rash [Hives] : no hives [Negative] : Genitourinary

## 2025-06-12 NOTE — HISTORY OF PRESENT ILLNESS
[Fever] : FEVER [Intermittent] : intermittent [___ Day(s)] : [unfilled] day(s) [Irritable] : irritable [Crying] : crying [Consolable] : consolable [Sick Contacts: ___] : sick contacts: [unfilled] [At Night] : at night [Acetaminophen] : acetaminophen [Change in sleep pattern] : change in sleep pattern [Ear Tugging] : ear tugging [Nasal Congestion] : nasal congestion [Max Temp: ____] : Max temperature: [unfilled] [Improving] : improving [Known Exposure to COVID-19] : no known exposure to COVID-19 [Baths] : baths [Cool compress] : cool compress [Ibuprofen] : ibuprofen [Runny Nose] : no runny nose [Teething] : no teething [Cough] : no cough [Wheezing] : no wheezing [Decreased Appetite] : no decreased appetite [Vomiting] : no vomiting [Diarrhea] : no diarrhea [Rash] : no rash

## 2025-06-12 NOTE — HISTORY OF PRESENT ILLNESS
[Fever] : FEVER [Intermittent] : intermittent [___ Day(s)] : [unfilled] day(s) [Irritable] : irritable [Crying] : crying [Consolable] : consolable [Sick Contacts: ___] : sick contacts: [unfilled] [At Night] : at night [Change in sleep pattern] : change in sleep pattern [Acetaminophen] : acetaminophen [Ear Tugging] : ear tugging [Nasal Congestion] : nasal congestion [Max Temp: ____] : Max temperature: [unfilled] [Improving] : improving [Known Exposure to COVID-19] : no known exposure to COVID-19 [Baths] : baths [Cool compress] : cool compress [Ibuprofen] : ibuprofen [Runny Nose] : no runny nose [Teething] : no teething [Cough] : no cough [Wheezing] : no wheezing [Decreased Appetite] : no decreased appetite [Vomiting] : no vomiting [Diarrhea] : no diarrhea [Rash] : no rash

## 2025-06-12 NOTE — DISCUSSION/SUMMARY
[FreeTextEntry1] : Very high fever for one day, difficult to control. Use alternating 5 ml of Tylenol or Motrin every 3 hours. Warm tepid baths and cool liquids. Rapid Flu negative.  Follow up in office if more symptoms develop.

## 2025-06-12 NOTE — PHYSICAL EXAM
[FreeTextEntry1] : General: irritable, lethargic, clingy HEENT: clear rhinorrhea, TM clear, pharynx mildly injected NECK: soft, no Brudzinski or Kernig signs CHEST: transmitted sounds, no rales or crackles CVS: mild tachycardia, no murmur EXT: no edema,  cap refills less than 2 sec SKIN: hot to touch, no rashes